# Patient Record
(demographics unavailable — no encounter records)

---

## 2024-11-14 NOTE — DISCUSSION/SUMMARY
[Normal Growth] : growth [Normal Development] : development [None] : No medical problems [No Elimination Concerns] : elimination [No Feeding Concerns] : feeding [No Skin Concerns] : skin [Normal Sleep Pattern] : sleep [Family Functioning] : family functioning [Nutrition and Feeding] : nutrition and feeding [Infant Development] : infant development [Oral Health] : oral health [Safety] : safety [Mother] : mother [Parental Concerns Addressed] : Parental concerns addressed [] : The components of the vaccine(s) to be administered today are listed in the plan of care. The disease(s) for which the vaccine(s) are intended to prevent and the risks have been discussed with the caretaker.  The risks are also included in the appropriate vaccination information statements which have been provided to the patient's caregiver.  The caregiver has given consent to vaccinate. [FreeTextEntry1] : Recommend breastfeeding, 8-12 feedings per day. If formula is needed, 2-4 oz every 3-4 hrs. Introduce single-ingredient foods rich in iron, one at a time.  When teeth erupt wipe daily with washcloth. When in car, patient should be in rear-facing car seat in back seat. Put baby to sleep on back, in own crib with no loose or soft bedding. Lower crib mattress. Help baby to maintain sleep and feeding routines. May offer pacifier if needed. Continue tummy time when awake. Ensure home is safe since baby is now more mobile. Do not use infant walker. Read aloud to baby. l

## 2024-11-14 NOTE — PHYSICAL EXAM
[Alert] : alert [Normocephalic] : normocephalic [Flat Open Anterior [place]] : flat open anterior fontanelle [Red Reflex] : red reflex bilateral [PERRL] : PERRL [Normally Placed Ears] : normally placed ears [Auricles Well Formed] : auricles well formed [Clear Tympanic membranes] : clear tympanic membranes [Light reflex present] : light reflex present [Bony landmarks visible] : bony landmarks visible [Nares Patent] : nares patent [Palate Intact] : palate intact [Uvula Midline] : uvula midline [Supple, full passive range of motion] : supple, full passive range of motion [Symmetric Chest Rise] : symmetric chest rise [Clear to Auscultation Bilaterally] : clear to auscultation bilaterally [Regular Rate and Rhythm] : regular rate and rhythm [S1, S2 present] : S1, S2 present [+2 Femoral Pulses] : (+) 2 femoral pulses [Soft] : soft [Bowel Sounds] : bowel sounds present [Central Urethral Opening] : central urethral opening [Testicles Descended] : testicles descended bilaterally [Patent] : patent [Normally Placed] : normally placed [No Abnormal Lymph Nodes Palpated] : no abnormal lymph nodes palpated [Symmetric Buttocks Creases] : symmetric buttocks creases [Plantar Grasp] : plantar grasp reflex present [Cranial Nerves Grossly Intact] : cranial nerves grossly intact [Acute Distress] : no acute distress [Discharge] : no discharge [Tooth Eruption] : no tooth eruption [Palpable Masses] : no palpable masses [Murmurs] : no murmurs [Tender] : nontender [Distended] : nondistended [Hepatomegaly] : no hepatomegaly [Splenomegaly] : no splenomegaly [Brown-Ortolani] : negative Brown-Ortolani [Allis Sign] : negative Allis sign [Spinal Dimple] : no spinal dimple [Tuft of Hair] : no tuft of hair [Rash or Lesions] : no rash/lesions

## 2024-11-14 NOTE — HISTORY OF PRESENT ILLNESS
[Mother] : mother [Well-balanced] : well-balanced [Formula ___ oz/feed] : [unfilled] oz of formula per feed [Normal] : Normal [No] : No cigarette smoke exposure [Water heater temperature set at <120 degrees F] : Water heater temperature set at <120 degrees F [Rear facing car seat in back seat] : Rear facing car seat in back seat [Carbon Monoxide Detectors] : Carbon monoxide detectors at home [Smoke Detectors] : Smoke detectors at home. [In Bassinet/Crib] : sleeps in bassinet/crib [Tummy time] : tummy time

## 2025-01-21 NOTE — HISTORY OF PRESENT ILLNESS
[de-identified] : cough  [FreeTextEntry6] : Presents with c/o cough/congestion x 1 week slight improvement.  Fever last week - resolved.  Recent travel. Was seen at a walk in and given Amox - mom unsure why he was given this medication.  Meds given: Zarbees infant BID. Tylenol last week. Amox completed yesterday.  Appetite slightly improved today/activity at baseline, drinking well, good UO.  No vomiting/No diarrhea.   + sick contacts. Feeding questions//overnight feeds.

## 2025-01-21 NOTE — DISCUSSION/SUMMARY
[FreeTextEntry1] : 8 month old male with acute URI Likely viral - no indication for antibiotic use at this time.  Declines RVP at this time.  Supportive care reviewed -- may use Zarbees PRN ( NO HONEY), Zyrtec 2.5ml qHS PRN, Nasal saline PRN, cool mist humidifier, steam up bathroom.   Good hydration discussed & good hand hygiene reviewed  If fever develops/persists > 48hr or condition worsens return for re-eval. Apply mupriocin 2-3x/day as directed to affected area.  RED FLAGS REVIEWED - indications for ED eval discussed, signs of distress/dehydration reviewed - parents agree with plan, demonstrates an understanding, is able to repeat back instructions and has no questions at this time.   d/w mom feedings & night time feedings.  Will increase solids and stop overnight bottles/feeds.  Discussed intro of foods & allergen foods including peanut.  Will reassess at well visit.  Return sooner PRN.  Well care as scheduled.

## 2025-01-21 NOTE — HISTORY OF PRESENT ILLNESS
[de-identified] : cough  [FreeTextEntry6] : Presents with c/o cough/congestion x 1 week slight improvement.  Fever last week - resolved.  Recent travel. Was seen at a walk in and given Amox - mom unsure why he was given this medication.  Meds given: Zarbees infant BID. Tylenol last week. Amox completed yesterday.  Appetite slightly improved today/activity at baseline, drinking well, good UO.  No vomiting/No diarrhea.   + sick contacts. Feeding questions//overnight feeds.

## 2025-03-11 NOTE — HISTORY OF PRESENT ILLNESS
[Formula ___ oz/feed] : [unfilled] oz of formula per feed [Hours between feeds ___] : Child is fed every [unfilled] hours [Fruit] : fruit [Eggs] : eggs [Water] : water [Normal] : Normal [In Crib] : sleeps in crib [Sleeps 12-16 hours per 24 hours (including naps)] : sleeps 12-16 hours per 24 hours (including naps) [Sippy Cup use] : sippy cup use [Brushing teeth] : not brushing teeth [None] : Primary Fluoride Source: None [No] : No cigarette smoke exposure [Up to date] : Up to date [de-identified] : [place]- did not want to drink the Nutramigen, changed approx 1 month, mom is aware that she was due to provide another stool sample  [de-identified] : increased straining since change to [place]

## 2025-03-11 NOTE — DISCUSSION/SUMMARY
[Normal Growth] : growth [Normal Development] : development [None] : No known medical problems [No Elimination Concerns] : elimination [No Feeding Concerns] : feeding [No Skin Concerns] : skin [Normal Sleep Pattern] : sleep [Family Adaptation] : family adaptation [Infant [place]] : infant independence [Feeding Routine] : feeding routine [Safety] : safety [No Medications] : ~He/She~ is not on any medications [Parent/Guardian] : parent/guardian [] : The components of the vaccine(s) to be administered today are listed in the plan of care. The disease(s) for which the vaccine(s) are intended to prevent and the risks have been discussed with the caretaker.  The risks are also included in the appropriate vaccination information statements which have been provided to the patient's caregiver.  The caregiver has given consent to vaccinate. [FreeTextEntry1] : Continue breastmilk or formula as desired. Increase table foods, 3 meals with 2-3 snacks per day. Incorporate up to 6 oz of flourinated water daily in a sippy cup. Discussed weaning of bottle and pacifier. Wipe teeth daily with washcloth. When in car, patient should be in rear-facing car seat in back seat. Put baby to sleep in own crib with no loose or soft bedding. Lower crib mattress. Help baby to maintain consistent daily routines and sleep schedule. Recognize stranger anxiety. Ensure home is safe since baby is increasingly mobile. Be within arm's reach of baby at all times. Use consistent, positive discipline. Avoid screen time. Read aloud to baby. RTO in 3 months for WCC, sooner with any other concerns

## 2025-03-11 NOTE — DEVELOPMENTAL MILESTONES
[Says "Pedro Pablo" or "Mama"] : says "Pedro Pablo" or "Mama" nonspecifically [Crawls] : crawls [Uses basic gestures] : uses basic gestures [Sits well without support] : sits well without support [Balances on hands and knees] : balances on hands and knees [Picks up small objects with 3 fingers] : picks up small objects with 3 fingers and thumb [Releases objects intentionally] : releases objects intentionally [Yes] : Completed.

## 2025-03-11 NOTE — PHYSICAL EXAM
[Alert] : alert [Normocephalic] : normocephalic [Flat Open Anterior [place]] : flat open anterior fontanelle [Red Reflex] : red reflex bilateral [PERRL] : PERRL [Normally Placed Ears] : normally placed ears [Auricles Well Formed] : auricles well formed [Clear Tympanic membranes] : clear tympanic membranes [Light reflex present] : light reflex present [Bony landmarks visible] : bony landmarks visible [Nares Patent] : nares patent [Palate Intact] : palate intact [Uvula Midline] : uvula midline [Supple, full passive range of motion] : supple, full passive range of motion [Symmetric Chest Rise] : symmetric chest rise [Clear to Auscultation Bilaterally] : clear to auscultation bilaterally [Regular Rate and Rhythm] : regular rate and rhythm [S1, S2 present] : S1, S2 present [+2 Femoral Pulses] : (+) 2 femoral pulses [Soft] : soft [Bowel Sounds] : bowel sounds present [Central Urethral Opening] : central urethral opening [Testicles Descended] : testicles descended bilaterally [No Abnormal Lymph Nodes Palpated] : no abnormal lymph nodes palpated [Symmetric Abduction and Rotation of hips] : symmetric abduction and rotation of hips [Straight] : straight [Cranial Nerves Grossly Intact] : cranial nerves grossly intact [Acute Distress] : no acute distress [Excessive Tearing] : no excessive tearing [Discharge] : no discharge [Tooth Eruption] : no tooth eruption [Palpable Masses] : no palpable masses [Murmurs] : no murmurs [Tender] : nontender [Distended] : nondistended [Hepatomegaly] : no hepatomegaly [Splenomegaly] : no splenomegaly [Circumcised] : not circumcised [Allis Sign] : negative Allis sign [Rash or Lesions] : no rash/lesions

## 2025-04-03 NOTE — HISTORY OF PRESENT ILLNESS
[FreeTextEntry6] : MOC recently noticed a lump on the forehead No known falls or injuries There is no bruising or abrasion The lump is in the center of the forehead extending in a linear fashion up to the anterior fontanelle It does not seem to bother him

## 2025-04-03 NOTE — PHYSICAL EXAM
[NL] : no acute distress, alert [FreeTextEntry2] : There is a lump is in the center of the forehead at the metopic suture line extending to the anterior fontanelle

## 2025-05-22 NOTE — PHYSICAL EXAM
[Alert] : alert [Normocephalic] : normocephalic [Closed Anterior [place]] : closed anterior fontanelle [Red Reflex] : red reflex bilateral [PERRL] : PERRL [Normally Placed Ears] : normally placed ears [Auricles Well Formed] : auricles well formed [Clear Tympanic membranes] : clear tympanic membranes [Light reflex present] : light reflex present [Bony landmarks visible] : bony landmarks visible [Nares Patent] : nares patent [Palate Intact] : palate intact [Uvula Midline] : uvula midline [Supple, full passive range of motion] : supple, full passive range of motion [Symmetric Chest Rise] : symmetric chest rise [Clear to Auscultation Bilaterally] : clear to auscultation bilaterally [Regular Rate and Rhythm] : regular rate and rhythm [S1, S2 present] : S1, S2 present [+2 Femoral Pulses] : (+) 2 femoral pulses [Soft] : soft [Bowel Sounds] : normoactive bowel sounds [Central Urethral Opening] : central urethral opening [Testicles Descended] : testicles descended bilaterally [No Abnormal Lymph Nodes Palpated] : no abnormal lymph nodes palpated [Symmetric Abduction and Rotation of Hips] : symmetric abduction and rotation of hips [Straight] : straight [Cranial Nerves Grossly Intact] : cranial nerves grossly intact [Discharge] : no discharge [Tooth Eruption] : no tooth eruption [Palpable Masses] : no palpable masses [Murmurs] : no murmurs [Tender] : nontender [Distended] : nondistended [Hepatomegaly] : no hepatomegaly [Splenomegaly] : no splenomegaly [Allis Sign] : negative Allis sign [Rash or Lesions] : no rash/lesions

## 2025-05-22 NOTE — DEVELOPMENTAL MILESTONES
[None] : none [Looks for hidden objects] : looks for hidden objects [Uses one word other than Mom or] : uses one word other than Mom or Dad or personal names [Takes first independent] : takes first independent steps [Stands without support] : stands without support [Picks up small object with 2 finger] : picks up small object with 2 finger pincer grasp [Picks up food and eats it] : picks up food and eats it

## 2025-05-22 NOTE — DISCUSSION/SUMMARY
[Normal Growth] : growth [Normal Development] : development [None] : No known medical problems [No Elimination Concerns] : elimination [No Feeding Concerns] : feeding [No Skin Concerns] : skin [Normal Sleep Pattern] : sleep [Family Support] : family support [Establishing Routines] : establishing routines [Feeding and Appetite Changes] : feeding and appetite changes [Establishing A Dental Home] : establishing a dental home [Safety] : safety [No Medications] : ~He/She~ is not on any medications [Parent/Guardian] : parent/guardian [] : The components of the vaccine(s) to be administered today are listed in the plan of care. The disease(s) for which the vaccine(s) are intended to prevent and the risks have been discussed with the caretaker.  The risks are also included in the appropriate vaccination information statements which have been provided to the patient's caregiver.  The caregiver has given consent to vaccinate. [FreeTextEntry1] : Discussed transition off of infant formula to whole cow's milk.  Continue table foods, 3 meals with 2-3 snacks per day. Encourage self feeding.  Avoid foods that can pose a choking hazard, ie nuts, hot dogs, uncut grapes, hard, raw veggies. Incorporate up to 6 oz of fluorinated water daily in a sippy cup. Brush teeth twice a day with soft toothbrush. Recommend visit to dentist.  When in car, keep child in rear-facing car seats until age 2, or until  the maximum height and weight for seat is reached.  Put baby to sleep in own crib with no loose or soft bedding. Try to ensure that our child is taking at least 1 nap daily . Lower crib mattress.  Help baby to maintain consistent daily routines and sleep schedule.  Recognize stranger and separation anxiety. Encourage strangers to approach slowly.  Ensure home is safe since baby is increasingly mobile. Be within arm's reach of baby at all times.  Use consistent, positive discipline- keep rules short and simple, praise child for good behavior  Avoid screen time.  Play and read aloud to baby.   F/u with NSG next month

## 2025-05-22 NOTE — HISTORY OF PRESENT ILLNESS
[Cow's milk ___ oz/feed] : [unfilled] oz of Cow's milk per feed [Normal] : Normal [Wakes up at night] : Wakes up at night [Brushing teeth] : Brushing teeth [Playtime] : Playtime  [No] : No cigarette smoke exposure [Water heater temperature set at <120 degrees F] : Water heater temperature set at <120 degrees F [Car seat in back seat] : Car seat in back seat [Smoke Detectors] : Smoke detectors [Carbon Monoxide Detectors] : Carbon monoxide detectors [Up to date] : Up to date [Fruit] : fruit [Vegetables] : vegetables [At risk for exposure to TB] : Not at risk for exposure to Tuberculosis [FreeTextEntry7] : no interim health changes  [de-identified] : have started whole milk  [FreeTextEntry3] : for a formula bottle  [NO] : No

## 2025-05-22 NOTE — PHYSICAL EXAM
[Alert] : alert [Normocephalic] : normocephalic [Closed Anterior [place]] : closed anterior fontanelle [Red Reflex] : red reflex bilateral [PERRL] : PERRL [Normally Placed Ears] : normally placed ears [Auricles Well Formed] : auricles well formed [Clear Tympanic membranes] : clear tympanic membranes [Light reflex present] : light reflex present [Bony landmarks visible] : bony landmarks visible [Nares Patent] : nares patent [Palate Intact] : palate intact [Uvula Midline] : uvula midline [Supple, full passive range of motion] : supple, full passive range of motion [Symmetric Chest Rise] : symmetric chest rise [Clear to Auscultation Bilaterally] : clear to auscultation bilaterally [Regular Rate and Rhythm] : regular rate and rhythm [S1, S2 present] : S1, S2 present [+2 Femoral Pulses] : (+) 2 femoral pulses [Soft] : soft [Bowel Sounds] : normoactive bowel sounds [Central Urethral Opening] : central urethral opening [Testicles Descended] : testicles descended bilaterally [No Abnormal Lymph Nodes Palpated] : no abnormal lymph nodes palpated [Symmetric Abduction and Rotation of Hips] : symmetric abduction and rotation of hips [Straight] : straight [Cranial Nerves Grossly Intact] : cranial nerves grossly intact [Discharge] : no discharge [Tooth Eruption] : no tooth eruption [Palpable Masses] : no palpable masses [Murmurs] : no murmurs [Tender] : nontender [Distended] : nondistended [Hepatomegaly] : no hepatomegaly [Splenomegaly] : no splenomegaly [Allis Sign] : negative Allis sign [Rash or Lesions] : no rash/lesions

## 2025-05-22 NOTE — HISTORY OF PRESENT ILLNESS
[Cow's milk ___ oz/feed] : [unfilled] oz of Cow's milk per feed [Normal] : Normal [Wakes up at night] : Wakes up at night [Brushing teeth] : Brushing teeth [Playtime] : Playtime  [No] : No cigarette smoke exposure [Water heater temperature set at <120 degrees F] : Water heater temperature set at <120 degrees F [Car seat in back seat] : Car seat in back seat [Smoke Detectors] : Smoke detectors [Carbon Monoxide Detectors] : Carbon monoxide detectors [Up to date] : Up to date [Fruit] : fruit [Vegetables] : vegetables [At risk for exposure to TB] : Not at risk for exposure to Tuberculosis [FreeTextEntry7] : no interim health changes  [de-identified] : have started whole milk  [FreeTextEntry3] : for a formula bottle  [NO] : No